# Patient Record
Sex: MALE | Race: WHITE
[De-identification: names, ages, dates, MRNs, and addresses within clinical notes are randomized per-mention and may not be internally consistent; named-entity substitution may affect disease eponyms.]

---

## 2019-01-13 ENCOUNTER — HOSPITAL ENCOUNTER (EMERGENCY)
Dept: HOSPITAL 80 - FED | Age: 34
LOS: 1 days | Discharge: HOME | End: 2019-01-14
Payer: COMMERCIAL

## 2019-01-13 DIAGNOSIS — R45.851: Primary | ICD-10-CM

## 2019-01-13 DIAGNOSIS — F32.3: ICD-10-CM

## 2019-01-13 PROCEDURE — G0480 DRUG TEST DEF 1-7 CLASSES: HCPCS

## 2019-01-13 NOTE — EDPHY
H & P


Source: Patient


Exam Limitations: No limitations


Time Seen by Provider: 01/13/19 23:45


HPI/ROS: 


HPI:  This is a 33-year-old male who presents with





Chief Complaint:  Suicidal thoughts and attempt





Location: psych 


Quality:  Suicidal thoughts and attempts


Duration:  Unknown


Signs and Symptoms:


Timing:  Acute on chronic


Severity:  Moderate to severe


Context:  Patient has a longstanding history of major depression presents on M1 

hold by [facility].  Will to come me police received phone call from 

the  at the [employer] who reported that the patient was 

sitting at the bar for approximately 30 min describing how he was going to walk 

into the creek and some urgency self in order to become "hypothermic and sink 

into a coma and die."  Patient reports that he does want to kill himself by 

hypothermia as this "seems to be less painful means."  Patient is very self 

critical reports that he has been depressed for the last 10 years.  Patient 

works for the [employer].  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  Patient is 

to stressful and reluctant to produce much history.  Patient believes that his 

parents called the police this evening.  Patient denies any previous suicide 

attempts.


Modifying Factors:  None





Comment: 








ROS: A comprehensive 10 system review of systems is otherwise negative aside 

from elements mentioned in the history of present illness. 





MEDICAL/SURGICAL/SOCIAL HISTORY: 


Medical history:  Major depression.


Surgical history:  Denies


Social history:  Drinks alcohol.  Denies tobacco or alcohol use.  Family 

history noncontributory.








CONSTITUTIONAL:  Nontoxic-appearing adult white male, refuses to open his eyes 

and keeps his eye shut during the entire conversation, awake and alert, no 

obvious distress


HEENT: Atraumatic and normocephalic, PERRL, EOMI.  Nares patent; no rhinorrhea;

  no nasal mucosal edema. Tympanic membranes clear. Oropharynx clear, no 

exudate and moist pink mucosa.  Airway patent.  No lymphadenopathy.  No 

meningismus.


Cardiovascular: Normal S1/S2, regular rate, regular rhythm, without murmur rub 

or gallop.


PULMONARY/CHEST:  Symmetrical and nontender. Clear to auscultation bilaterally. 

Good air movement. No accessory muscle usage.


ABDOMEN:  Soft, nondistended, nontender, no rebound, no guarding, no peritoneal 

signs, no masses or organomegaly. No CVAT.


EXTREMITIES:  2/2 pulses, strength 5/5, no deformities, no clubbing, no 

cyanosis or edema.


NEUROLOGICAL: no focal neuro deficits.  GCS 15.


SKIN: Warm and dry, no erythema. no rash.  Good capillary refill. 


PSYCH: Poor eye contact, no flight of ideas, organized thought process, poor 

insight and judgment, no auditory hallucinations, no visual hallucinations, + 

suicidal ideation with a plan, no homicidal ideation, no paranoia





 (Nicky Ruiz)


Constitutional: 


 Initial Vital Signs











Temperature (C)  36.6 C   01/13/19 23:50


 


Heart Rate  80   01/13/19 23:50


 


Respiratory Rate  18   01/13/19 23:50


 


Blood Pressure  103/66   01/13/19 23:50


 


O2 Sat (%)  96   01/13/19 23:50








 











O2 Delivery Mode               Room Air














Allergies/Adverse Reactions: 


 





No Known Allergies Allergy (Unverified 01/13/19 23:53)


 








Home Medications: 














 Medication  Instructions  Recorded


 


Bupropion HCl [Wellbutrin Xl] 450 mg PO 01/13/19


 


Effexor Xr 75MG (*)  01/13/19


 


Levocetirizine Dihydrochloride 5 mg PO 01/13/19





[Xyzal]  














Medical Decision Making


ED Course/Re-evaluation: 


2350: Agree with M1 hold as patient is severely depressed with failed suicide 

attempt.  Labs and UDS ordered.  Patient is currently calm and cooperative in 

no chemical intervention is required.


0100: End of Shift. Signed over to Dr. Romo pending medical clearance, 

mental health evaluation and final disposition.  








This patient was seen under the supervision of my secondary supervising 

physician. Discussed this patient with Dr. Romo.  


 (Nicky Ruiz)





PHYSICIAN DOCUMENTATION:


The patient was evaluated and managed by the Physician Assistant.  My co-

signature indicates that I have reviewed this chart and I agree with the 

findings and plan of care as documented.  I am the secondary supervising 

physician.





7:00 a.m.-


The patient was seen by Kevin from the mental health team.  He was deemed not 

at risk for SI.  His M1 hold was vacated.  He no longer feels suicidal.  He is 

not intoxicated.  He is followed by a therapist and a psychiatrist and in fact 

has an appointment later today.  He will be discharged from the emergency 

department. (Mayte Romo)


Differential Diagnosis: 


Differential diagnosis includes but is not limited to major depression, anxiety 

disorder, schizophrenia, bipolar disorder, intoxicant use, suicidal ideation, 

psychosis, dina.


 (Nicky Ruiz)





- Data Points


Laboratory Results: 


 Laboratory Results





 01/14/19 00:00 





 01/14/19 00:00 





 











  01/14/19 01/14/19 01/14/19





  05:40 00:00 00:00


 


WBC      8.79 10^3/uL 10^3/uL





     (3.80-9.50) 


 


RBC      4.60 10^6/uL 10^6/uL





     (4.40-6.38) 


 


Hgb      14.0 g/dL g/dL





     (13.7-17.5) 


 


Hct      39.9 % L %





     (40.0-51.0) 


 


MCV      86.7 fL fL





     (81.5-99.8) 


 


MCH      30.4 pg pg





     (27.9-34.1) 


 


MCHC      35.1 g/dL g/dL





     (32.4-36.7) 


 


RDW      12.1 % %





     (11.5-15.2) 


 


Plt Count      222 10^3/uL 10^3/uL





     (150-400) 


 


MPV      9.8 fL fL





     (8.7-11.7) 


 


Neut % (Auto)      72.8 % %





     (39.3-74.2) 


 


Lymph % (Auto)      17.9 % %





     (15.0-45.0) 


 


Mono % (Auto)      5.9 % %





     (4.5-13.0) 


 


Eos % (Auto)      2.7 % %





     (0.6-7.6) 


 


Baso % (Auto)      0.5 % %





     (0.3-1.7) 


 


Nucleat RBC Rel Count      0.0 % %





     (0.0-0.2) 


 


Absolute Neuts (auto)      6.40 10^3/uL 10^3/uL





     (1.70-6.50) 


 


Absolute Lymphs (auto)      1.57 10^3/uL 10^3/uL





     (1.00-3.00) 


 


Absolute Monos (auto)      0.52 10^3/uL 10^3/uL





     (0.30-0.80) 


 


Absolute Eos (auto)      0.24 10^3/uL 10^3/uL





     (0.03-0.40) 


 


Absolute Basos (auto)      0.04 10^3/uL 10^3/uL





     (0.02-0.10) 


 


Absolute Nucleated RBC      0.00 10^3/uL 10^3/uL





     (0-0.01) 


 


Immature Gran %      0.2 % %





     (0.0-1.1) 


 


Immature Gran #      0.02 10^3/uL 10^3/uL





     (0.00-0.10) 


 


Sodium    141 mEq/L mEq/L  





    (135-145)  


 


Potassium    4.1 mEq/L mEq/L  





    (3.5-5.2)  


 


Chloride    109 mEq/L mEq/L  





    ()  


 


Carbon Dioxide    24 mEq/l mEq/l  





    (22-31)  


 


Anion Gap    8 mEq/L mEq/L  





    (6-14)  


 


BUN    19 mg/dL mg/dL  





    (7-23)  


 


Creatinine    1.0 mg/dL mg/dL  





    (0.7-1.3)  


 


Estimated GFR    > 60   





    


 


Glucose    122 mg/dL H mg/dL  





    ()  


 


Calcium    9.9 mg/dL mg/dL  





    (8.5-10.4)  


 


Urine Opiates Screen  NEGATIVE     





   (NEGATIVE)   


 


Urine Barbiturates  NEGATIVE     





   (NEGATIVE)   


 


Valproic Acid    < 10.0 mcg/mL L mcg/mL  





    (50.0-150.0)  


 


Ur Phencyclidine Scrn  NEGATIVE     





   (NEGATIVE)   


 


Ur Amphetamine Screen  NEGATIVE     





   (NEGATIVE)   


 


U Benzodiazepines Scrn  NEGATIVE     





   (NEGATIVE)   


 


Urine Cocaine Screen  NEGATIVE     





   (NEGATIVE)   


 


U Marijuana (THC) Screen  NEGATIVE     





   (NEGATIVE)   


 


Ethyl Alcohol    < 10 mg/dL mg/dL  





    (0-10)  














Departure





- Departure


Disposition: Home, Routine, Self-Care


Clinical Impression: 


 Severe major depression without psychotic features





Condition: Good


Instructions:  Depression (ED)


Referrals: 


[facility],. [Clinic] - As per Instructions

## 2019-01-14 VITALS — SYSTOLIC BLOOD PRESSURE: 99 MMHG | DIASTOLIC BLOOD PRESSURE: 56 MMHG

## 2019-01-14 LAB — PLATELET # BLD: 222 10^3/UL (ref 150–400)

## 2019-01-14 NOTE — ASMTTCLDSP
[facility] Discharge Disposition

 

Disposition:                  Answers:  Discharge                             

If                            Answers:  Yes                                   

DISCHARGED: Patient/family                                                    

 given suicide hotline                                                        

info & [employer] brochure?                                                       

Disposition Notes:            

Notes:

Pt stated commitment or ability to keep self safe, denied thoughts of self harm or harm to 

others. Pt expressed a desire to f/u with his regularly scheduled weekly therapist appointment with 


Chilo Ly today at 5:30 pm. Pt was given local hotline information and [employer] brochure After an 

Attempt and encouraged to follow up with his therapist appointment today at 5:30 p.m.

Discharge                     

Concerns/Recommendations:     

Notes:

In consultation with [facility] ED physician, Mayte Romo MD, Dr. Romo concurred that pt does 

not appear to meet 27-65 criteria requiring psychiatric hospitalization as pt does not appear to be 


an imminent risk of harm to self/others/gravely disabled due to a mental illness 

condition. Dr. Romo provided verbal order read back vacating M1 hold at 0655 hrs.

Was patient given the         Answers:  Not applicable                        

Inpatient Behavioral                                                          

Health Prohibited                                                             

Belongings List while in                                                      

the ED?                                                                       

Psychiatrist vacating M1      Mayte Romo MD

Hold:                         

Date and time M1 hold         01/14/2019 06:55 AM

vacated (time format is       

hh:mm):                       

Type of Hold:                 Answers:  M1/72-hour Hold                       

Hold initiated by:            Answers:  Police                                

 

Date Signed:  01/14/2019 07:31 AM

Electronically Signed By:Kevin Snider

## 2019-01-14 NOTE — ASMTTLCEVL
TLC Evaluation - Basic Information

 

Marital status/children       

Notes:

Pt is single, never , no dependents and is not involved in a relationship.

Living situation              

Notes:

Pt has lived in [place] for 3-4 years. He lives alone in his apartment in [place].

Sexual                        

history/orientation           

Notes:

Pt described that he is bisexual. Not active.

Peer support/family           

strengths                     

Notes:

Pt stated having no local supports other than his therapist.

Education level/history       

Notes:

Pt reported having obtained a bachelors degree in communication broadcasting in 2006 from 

[employer].

Work history                  

Notes:

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

                      

Notes:

None.

Legal                         

Notes:

Pt denied any arrest/legal history.

Adventism/Spiritual           

Notes:

Pt identified himself as being Buddhism.

Leisure                       

Notes:

Pt reported not much.

Patient's strengths           Answers:  Intelligent                           

(Please select at least                                                       

TWO strengths):                                                               

                                        Willingness                           

Evaluation Start Date and     01/14/2019 06:15 AM

Time                          

Hospital Status               Answers:  M1 Hold                               

72-hr M1 Hold Start Date      01/13/2019 11:10 PM

and Time                      

Patient statement             

Notes:

Cortney been feeling very unhappy about life. I called my parents last night and told them I had gone 

into a creek to try to get hypothermia to try to kill myself. They called the police and the 

brought me here. I feel I can ensure my own safety if allowed to be released from the hospital and 

want to follow up with my regular weekly therapist appointment with Chilo Ly today at 5:30 pm.

Narrative                     

Notes:

Pt is a 32 yo, single, employed,  male with reported history of feeling sad and lonely 

since age 12 when his family moved from [place] to [place] and being treated for 

depression for the past 2 years under the care of an outpatient psychiatrist in [place] named 

Chilo Dent MD and sees therapist named Chilo Ly in [place] on a weekly basis on Mondays for 

the past 2 years. His next appointment with his therapist is today at 5:30 pm. Pt requested to not 

have parents called.

Diagnosis History             

Notes:

Pt reported history of feeling sad and lonely since age 12 when his family moved from [place] to 


[place] and being treated for depression for the past 2 years.

Prior suicide attempts        

Notes:

Pt denied any actual suicide attempt history. He reported having suicidal ideation around age 19 

and was hospitalized for a week somewhere in [place].

Prior hospitalizations        

Notes:

He reported having suicidal ideation around age 19 and was hospitalized for a week somewhere in 

[place]. 

Treatment Responses           

Notes:

Pt reported being medication compliant.

History of violence           

Notes:

Pt denied any past history of violence/aggression and denied any homicidal ideation.

Therapist:                    Pt reported that he sees a therapist named Chilo Ly in [place] on a weekly basis on Mondays for the 

                              past 2 years. His next appointment with his 

                              therapist is today at 5:30 pm.

Psychiatrist:                 Pt reported being treated for depression for the 

                              past 2 years under the care of an outpatient 

                              psychiatrist in [place] named Chilo Dent MD.

Medications                   

(name, dosage, route, freq    

uency)                        

Notes:

Xyzal 5 mg po daily; Effexor  mg po daily; Wellbutrin 450 mg po daily. Pt reported being 

medication compliant.

Allergies/Reaction            

Notes:

NKDA.

Sleep                         

Notes:

Pt reported he sleeps through the night and has been feeling hypersomnolent lately.

Appetite                      

Notes:

WNL.

Medical/Surgical history      

Notes:

Noncontributory.

Substance use history         

(frequency, intensity, his    

tory, duration)               

Notes:

Pt reported he first tried alcohol around the age of 16-17. He reported that when he was 

drinking, he would typically drink 2 beers several times a week. He reported his last use of 

alcohol was about 6 weeks ago. He stated he stopped drinking at that time due to the diet he is 

currently on. Pt reported having first tried marijuana at age 21. He added that he does not like 

marijuana and his last use was reported as a few months ago. He reported having used a pill form of 


mushrooms last night for the first and only time. He added that it likely lowered his 

inhibitions. He otherwise denied any other history of use of other illicit substances. BAL was 

zero. UDS results negative for all tested substances.

Family composition            

Notes:

Parents remain  and reside in [place], [place]. He has a brother named Rufus age 27 that lives in 

[place] also. He has a sister named Monique age 32 that lives in [facility].

Need for family               Answers:  No                                    

participation in                                                              

patient's care                                                                

Family                        

psychiatric/substance         

abuse history                 

Notes:

Pt denied knowledge of any family history of mental illness or substance abuse.

Developmental history         

Notes:

Pt reported that he was born and raised in [place] until the age of 12 when his family moved to 

[place], [place]. Pt reported I did not adjust well socially to the move and had difficulty making 

friends. Pt denied having any learning challenges in school and endorsed having achieved normal 

childhood developmental milestones. He reported having sustain a concussion with LOC in the 8th 

grade when he fell off of a railing and hit his head. He denied any childhood history of 

physical, emotional or sexual abuse/trauma.

Abuse concerns                Answers:  None                                  

TLC Evaluation - Mental Status Exam

 

Appearance:                   Answers:  Appropriate                           

                                        Clean                                 

                                        Neat                                  

Eye Contact:                  Answers:  Intermittent                          

Mood:                         Answers:  Depressed                             

                                        Sad                                   

Affect:                       Answers:  Calm                                  

                                        Flat                                  

                                        Sad                                   

                                        Subdued                               

                                        Tearful                               

Behavior:                     Answers:  Cooperative                           

                                        Fatigued                              

                                        Passive                               

                                        Withdrawn                             

Speech:                       Answers:  Relevant                              

                                        Logical                               

                                        Clear                                 

                                        Coherent                              

                                        Slowed                                

                                        Soft                                  

Thought Process:              Answers:  Organized                             

                                        Oriented                              

                                        Alert                                 

                                        Goal Oriented                         

                                        Intact                                

Insight:                      Answers:  Fair                                  

Judgement:                    Answers:  Fair                                  

Hallucinations:               Answers:  None                                  

Current Stage of Change       Answers:  Maintenance                           

Pt reported to have           Answers:  Yes                                   

suicidal/self-injuring                                                        

ideation/behavior?                                                            

Pt reported to be making      Answers:  No                                    

suicidal/self-injuring                                                        

threats?                                                                      

Pt reported to have           Answers:  No                                    

aggression/assault                                                            

ideation/behavior?                                                            

Pt exhibits inability to      Answers:  No                                    

care for self/grave                                                           

disability?                                                                   

Ideation/behavior is          Answers:  No                                    

chronic?                                                                      

Patient has a specific        Answers:  No                                    

plan?                                                                         

Pt has access to means to     Answers:  No                                    

execute the plan?                                                             

Ideation involves             Answers:  No                                    

serious/lethal intent?                                                        

Ideation has                  Answers:  No                                    

delusional/hallucinatory                                                      

content?                                                                      

History of                    Answers:  No                                    

suicidal/self-injuring                                                        

ideation, behavior, or                                                        

threats?                                                                      

History of                    Answers:  No                                    

aggressive/assaultive                                                         

ideation, behavior, or                                                        

threats?                                                                      

History of serious            Answers:  No                                    

physical harm to                                                              

self/others while in                                                          

treatment setting?                                                            

[facility] Evaluation - Suicide/Homicide Risk

 

Suicide Risk Factors:         Answers:  Anhedonia                             

                                        Cluster "B" D/O or Traits             

                                        Flat Affect                           

                                        Inadequate Social Support             

                                        Major Depression                      

                                        Single                                

Homicide/violence risk        Answers:  None                                  

factors:                                                                      

Current Suicidal              Answers:  Yes                                   

Ideation?                                                                     

Current Suicidal Ideation     Answers:  No                                    

in the Past 48 Hours?                                                         

Current Suicidal Ideation     Answers:  No                                    

in the Past Month?                                                            

Current Suicidal              Answers:  No                                    

Ideation, Worst Ever?                                                         

Suicide Internal              Answers:  Absence of Psychosis                  

Protective Factors:                                                           

                                        Frustration Tolerance                 

                                        Adventism Beliefs                     

Suicide External              Answers:  Positive Therapeutic                  

Protective Factors:                     Relationships                         

Ranking of patient's          Answers:  Low                                   

suicidal risk:                                                                

Ranking of patient's          Answers:  Low                                   

homicidal risk:                                                               

TLC Evaluation - Wrap-up

 

BDI Total Score:              30

BDI Question #2 Score:        3

BDI Question #9 Score:        2

BSS Total Score:              34

AXIS I Diagnosis (include     

DSM-V and ICD-10              

codes), must also be          

entered in                    

[email or web address], which is the        

source of truth.              

Notes:

Major Depressive Disorder, recurrent, moderate 296.32  (F33.1)



In consultation with [facility] ED physician, Mayte Romo MD, Dr. Romo concurred that pt does 

not appear to meet 27-65 criteria requiring psychiatric hospitalization as pt does not appear to be 


an imminent risk of harm to self/others/gravely disabled due to a mental illness 

condition. Dr. Romo provided verbal order read back vacating M1 hold at 0655 hrs.

Evaluation End Date and       01/14/2019 07:30 AM

Time (HH:MM):                 

 

Date Signed:  01/14/2019 07:30 AM

Electronically Signed By:Kevin Snider